# Patient Record
Sex: MALE | Race: WHITE | NOT HISPANIC OR LATINO | URBAN - METROPOLITAN AREA
[De-identification: names, ages, dates, MRNs, and addresses within clinical notes are randomized per-mention and may not be internally consistent; named-entity substitution may affect disease eponyms.]

---

## 2019-04-14 ENCOUNTER — EMERGENCY (EMERGENCY)
Facility: HOSPITAL | Age: 64
LOS: 1 days | Discharge: HOME | End: 2019-04-14
Attending: EMERGENCY MEDICINE | Admitting: EMERGENCY MEDICINE
Payer: MEDICARE

## 2019-04-14 VITALS
HEART RATE: 72 BPM | TEMPERATURE: 99 F | SYSTOLIC BLOOD PRESSURE: 157 MMHG | RESPIRATION RATE: 14 BRPM | DIASTOLIC BLOOD PRESSURE: 82 MMHG | OXYGEN SATURATION: 98 %

## 2019-04-14 VITALS
SYSTOLIC BLOOD PRESSURE: 162 MMHG | HEART RATE: 86 BPM | WEIGHT: 169.98 LBS | OXYGEN SATURATION: 96 % | RESPIRATION RATE: 14 BRPM | TEMPERATURE: 99 F | DIASTOLIC BLOOD PRESSURE: 90 MMHG

## 2019-04-14 PROCEDURE — 70160 X-RAY EXAM OF NASAL BONES: CPT | Mod: 26

## 2019-04-14 PROCEDURE — 73562 X-RAY EXAM OF KNEE 3: CPT | Mod: 26,LT

## 2019-04-14 PROCEDURE — 99283 EMERGENCY DEPT VISIT LOW MDM: CPT | Mod: 25

## 2019-04-14 NOTE — ED ADULT NURSE NOTE - OBJECTIVE STATEMENT
Pt BIB daughter c/o left knee pain and abrasion to nose s/p mechanical fall yesterday from standing height, no LOC, no use of blood thinners. Pt is A&Ox3, neuro/sensory intact, denies any blurred vision/headache/dizziness, ambulating with steady gait without assistance.

## 2019-04-14 NOTE — ED PROVIDER NOTE - OBJECTIVE STATEMENT
64 yo male presents his daughter complaint of left knee pain s/p fall from standing height yesterday while walking. pt aso notes abrsion to nose with fall.      The patient denies the following symptoms:  headache, dizziness/lightheadedness, neck pain/neck stiffness, numbness/tingling, photophobia, change in vision/hearing/gait/mental status/speech,difficulty swallowing, focal weakness,  fever, chills, chest/neck/jaw/arm or back pain, palpitations, shortness of breath, diaphoresis, N/V/D, abdominal pain, , back pain,

## 2019-04-14 NOTE — ED ADULT NURSE NOTE - CHPI ED NUR SYMPTOMS NEG
no difficulty bearing weight/no bruising/no fever/no tingling/no weakness/no back pain/no numbness/no deformity

## 2019-04-14 NOTE — ED ADULT TRIAGE NOTE - CHIEF COMPLAINT QUOTE
left knee pain s/p trip and fall yesterday. nonweight bearing to lle although ambulatory. +mild swelling no deformity.  +head injury, swelling and abrasion to nose. no loc. no anticoags. aox3 verbal, accompanied with daughter.

## 2019-04-14 NOTE — ED ADULT NURSE NOTE - NSIMPLEMENTINTERV_GEN_ALL_ED
Implemented All Fall Risk Interventions:  [place] to call system. Call bell, personal items and telephone within reach. Instruct patient to call for assistance. Room bathroom lighting operational. Non-slip footwear when patient is off stretcher. Physically safe environment: no spills, clutter or unnecessary equipment. Stretcher in lowest position, wheels locked, appropriate side rails in place. Provide visual cue, wrist band, yellow gown, etc. Monitor gait and stability. Monitor for mental status changes and reorient to person, place, and time. Review medications for side effects contributing to fall risk. Reinforce activity limits and safety measures with patient and family.

## 2019-04-14 NOTE — ED PROVIDER NOTE - MUSCULOSKELETAL, MLM
+ mild tenderness ad swelling to anterior left knee able to weight bear with mild pain to left knee skin intact full flex and extension. LLE nvi compartments' soft

## 2019-04-14 NOTE — ED PROVIDER NOTE - CLINICAL SUMMARY MEDICAL DECISION MAKING FREE TEXT BOX
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 The patient verbalized understanding of these precautions and need to return. At the time of discharge from the Emergency Department, the patient is alert with fluent appropriate speech and ambulatory without difficulty.  A medical screening examination was performed and no emergency medical condition was identified.    I have discussed the discharge plan with the patient. The patient agrees with the plan, as discussed.  The patient understands Emergency Department diagnosis is a preliminary diagnosis often based on limited information and that the patient must adhere to the follow-up plan as discussed.  The patient understands that if the symptoms worsen or if prescribed medications do not have the desired/planned effect that the patient may return to the Emergency Department at any time for further evaluation and treatment.

## 2019-04-14 NOTE — ED PROVIDER NOTE - DIAGNOSTIC INTERPRETATION
Interpreted by ED physician cece  left knee x-ray, 3 views  No fracture, no dislocation (joint spaces grossly normal), no Foreign Body noted, elevated position of patella    Interpreted by ED physician cece  nasal bones xray  3 views   no fx no sts no foreign body

## 2019-04-14 NOTE — ED PROVIDER NOTE - ENMT, MLM
Airway patent, Nasal mucosa clear. Mouth with normal mucosa. Throat has no vesicles, no oropharyngeal exudates and uvula is midline.  + abrasion and swelling to nasal bridge no septal hematoma no dental injury teeth well aligned no head trauma
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
## 2019-04-17 DIAGNOSIS — M25.462 EFFUSION, LEFT KNEE: ICD-10-CM
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
## 2019-04-17 DIAGNOSIS — S00.31XA ABRASION OF NOSE, INITIAL ENCOUNTER: ICD-10-CM

## 2019-04-17 DIAGNOSIS — M25.562 PAIN IN LEFT KNEE: ICD-10-CM
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
